# Patient Record
Sex: MALE | Race: WHITE
[De-identification: names, ages, dates, MRNs, and addresses within clinical notes are randomized per-mention and may not be internally consistent; named-entity substitution may affect disease eponyms.]

---

## 2018-11-25 ENCOUNTER — HOSPITAL ENCOUNTER (EMERGENCY)
Dept: HOSPITAL 62 - ER | Age: 23
Discharge: HOME | End: 2018-11-25
Payer: SELF-PAY

## 2018-11-25 VITALS — DIASTOLIC BLOOD PRESSURE: 76 MMHG | SYSTOLIC BLOOD PRESSURE: 147 MMHG

## 2018-11-25 DIAGNOSIS — R19.7: ICD-10-CM

## 2018-11-25 DIAGNOSIS — R22.1: ICD-10-CM

## 2018-11-25 DIAGNOSIS — B97.89: ICD-10-CM

## 2018-11-25 DIAGNOSIS — H92.01: ICD-10-CM

## 2018-11-25 DIAGNOSIS — J02.9: Primary | ICD-10-CM

## 2018-11-25 DIAGNOSIS — R51: ICD-10-CM

## 2018-11-25 PROCEDURE — 87070 CULTURE OTHR SPECIMN AEROBIC: CPT

## 2018-11-25 PROCEDURE — 87880 STREP A ASSAY W/OPTIC: CPT

## 2018-11-25 PROCEDURE — 99283 EMERGENCY DEPT VISIT LOW MDM: CPT

## 2018-11-25 PROCEDURE — 96372 THER/PROPH/DIAG INJ SC/IM: CPT

## 2018-11-25 NOTE — ER DOCUMENT REPORT
ED General





- General


Chief Complaint: Sore Throat


Stated Complaint: SWOLLEN THROAT


Time Seen by Provider: 11/25/18 01:54


Mode of Arrival: Ambulatory


Information source: Patient


TRAVEL OUTSIDE OF THE [place] IN LAST 30 DAYS: No





- HPI


Onset: Yesterday


Onset/Duration: Sudden


Notes: 





23-year-old healthy male with no past medical history since to the emergency 

department for a swollen throat, cannot breathe, earache, diarrhea, hurts to 

eat or swallow that started Thursday.  He endorses swelling of his tonsils and 

dysphagia.  Denies any sick contacts.  He endorses occasional headache, his 

right ear hurts, his neck is swollen on the right side and tense, diarrhea 4-5 

episodes since Thursday, decreased appetite, intermittent cough.  Denies dyspnea

, chest pain, nausea, vomiting.  Patient has not received a flu shot this year.





Past Medical History





- General


Information source: Patient





- Social History


Smoking Status: Never Smoker


Family History: None





Review of Systems





- Review of Systems


Constitutional: See HPI


EENT: See HPI


Cardiovascular: See HPI


Respiratory: See HPI


Gastrointestinal: See HPI


Genitourinary: No symptoms reported


Male Genitourinary: No symptoms reported


Musculoskeletal: No symptoms reported


Skin: No symptoms reported


Hematologic/Lymphatic: No symptoms reported


Neurological/Psychological: No symptoms reported





Physical Exam





- Vital signs


Vitals: 


 











Temp Pulse Resp BP Pulse Ox


 


 97.3 F   88   16   151/82 H  97 


 


 11/25/18 01:43  11/25/18 01:43  11/25/18 01:43  11/25/18 01:43  11/25/18 01:43














- Notes


Notes: 





Reviewed vital signs and nursing note as charted by RN. 


CONSTITUTIONAL: Well-appearing, well-nourished; acting appropriately for age   


HEAD: Normocephalic; atraumatic; No swelling


EYES: PERRL; Conjunctivae clear, no drainage; EOMI


ENT: External ears without lesions; External auditory canal is patent; TMs 

without erythema, landmarks clear and well visualized; no rhinorrhea; + 

pharyngeal exudate, + tonsillar hypertrophy touching uvula bilateral, uvula 

pink and midline no swelling, airway patent, mucous membranes pink and moist


NECK: Supple, + R anterior cervical lymphadenopathy, no masses


CARD: Regular rate and rhythm; no murmurs, no rubs, no gallops, capillary 

refill < 2 seconds, symmetric pulses


RESP:  Respiratory rate and effort are normal. There is normal chest excursion.

  No respiratory distress, no retractions, no stridor, no nasal flaring, no 

accessory muscle use.  The lungs are clear to auscultation bilaterally, no 

wheezing, no rales, no rhonchi.  


ABD/GI: Normal bowel sounds; non-distended; soft, non-tender, no rebound, no 

guarding, no palpable organomegaly


EXT: Normal ROM in all joints; non-tender to palpation; no effusions, no edema 


SKIN: Normal color for age and race; warm; dry; good turgor; no acute lesions 

noted


NEURO: No facial asymmetry; Moves all extremities equally; Motor and sensory 

function intact





Course





- Re-evaluation


Re-evalutation: 





11/25/18 02:14


Patient with bilateral pharyngeal hypertrophy and tonsillar exudate present.  

Rapid strep ordered.  Dexamethasone 10 mg IM ordered.


11/25/18 02:41


Strep negative.  Most likely represents a viral pharyngitis.  Uvula was midline 

and no evidence of uvulitis.  Low concern for PTA at this point.





- Vital Signs


Vital signs: 


 











Temp Pulse Resp BP Pulse Ox


 


 97.3 F   88   16   151/82 H  97 


 


 11/25/18 01:43  11/25/18 01:43  11/25/18 01:43  11/25/18 01:43  11/25/18 01:43














Discharge





- Discharge


Clinical Impression: 


 Acute viral pharyngitis





Condition: Good


Disposition: HOME, SELF-CARE


Instructions:  Acetaminophen, Use of Over-The-Counter Ibuprofen ([facility]), Sore 

Throat ([facility])


Additional Instructions: 


You were seen in the emergency department this evening for a sore throat.  Your 

rapid strep test was negative which means this is most likely a viral 

pharyngitis.  The case 85-90% at the time.  Antibiotics will not help.  He 

received a steroid shot that will help with the inflammation and you should see 

results in the next 12-24 hours.  Please take Motrin 600 mg 4 times a day when 

your throat is feeling better and you are able to swallow.  Also you can take 

Tylenol 1000 mg 4 times a day as well.  You can take them at the same time.  

Please take them around the clock for the next 1-2 days until your symptoms 

start to improve.  If you have symptoms of shortness of breath or difficulty 

breathing like her throat is closing off please really return to the emergency 

department if you develop neck swelling or you notice that your uvula in the 

back of your throat is starting to deviate one way that is concerning for 

possible abscess, please return to the emergency department.  Also please follow

-up in the urgent care or the emergency department if your symptoms do not 

begin to resolve in the next 5-7 days.


Forms:  Return to Work


Referrals: 


LOCALMD,NO [NO LOCAL MD] - Follow up as needed